# Patient Record
Sex: MALE
[De-identification: names, ages, dates, MRNs, and addresses within clinical notes are randomized per-mention and may not be internally consistent; named-entity substitution may affect disease eponyms.]

---

## 2023-01-06 ENCOUNTER — NON-APPOINTMENT (OUTPATIENT)
Age: 18
End: 2023-01-06

## 2023-01-06 ENCOUNTER — APPOINTMENT (OUTPATIENT)
Dept: ORTHOPEDIC SURGERY | Facility: CLINIC | Age: 18
End: 2023-01-06
Payer: COMMERCIAL

## 2023-01-06 VITALS — HEIGHT: 68 IN | WEIGHT: 120 LBS | BODY MASS INDEX: 18.19 KG/M2

## 2023-01-06 PROBLEM — Z00.00 ENCOUNTER FOR PREVENTIVE HEALTH EXAMINATION: Status: ACTIVE | Noted: 2023-01-06

## 2023-01-06 PROCEDURE — 99203 OFFICE O/P NEW LOW 30 MIN: CPT

## 2023-01-06 PROCEDURE — 73630 X-RAY EXAM OF FOOT: CPT | Mod: 50

## 2023-01-06 PROCEDURE — 73610 X-RAY EXAM OF ANKLE: CPT | Mod: 50

## 2023-01-06 NOTE — IMAGING
[de-identified] :   Physical exam of the right foot/ankle:   Mild swelling over the lateral aspect of the right ankle.  No ecchymosis, or erythema appreciated. Skin is intact. Patient able to plantarflex, dorsiflex, invert, and eldon the ankle with no limitations. mild tenderness over the ATFL.  no tenderness over the PTFL, Deltoid, or CFL. No tenderness over the medial and lateral malleoli. No tenderness over the Achilles tendon or calcaneus. no tenderness over the metatarsals.  No tenderness over Lisfranc.   Ankle mortis within normal limits. DP pulses palpable.

## 2023-01-06 NOTE — DATA REVIEWED
[FreeTextEntry1] :   Bilateral X-rays of the right foot and ankle taken here in the office today:   No acute fractures, subluxation, or dislocation.  No acute osseous abnormalities.  Closed growth plates.

## 2023-01-06 NOTE — DISCUSSION/SUMMARY
[de-identified] :  treatment plan for right ankle sprain:\par \par   I recommended anti-inflammatory medication.  Patient prefers to continue taking Advil /ibuprofen over-the-counter as needed for pain. benefits discussed.\par \par  I recommended patient continue using Cam walker boot for another 2-3 weeks.  It is to wear the boot at all times except when showering and sleeping.  Encouraged gentle range of motion.  encouraged rest, ice, and elevation.  Patient is able to soak right ankle/ foot in a bucket of  Warm water and Epsom salt.\par \par  note provided the patient will be cleared to work beginning 01/20/2023. All questions and concerns addressed patient's satisfaction.  Patient and mother expressed full understanding of treatment plan.\par \par   Patient will follow up in 3-4 weeks for further evaluation and treatment.

## 2023-01-06 NOTE — HISTORY OF PRESENT ILLNESS
[de-identified] :  patient is a 17-year-old male here accompanied by his mother for evaluation of right ankle pain.  Patient reports on 12/23/2022 [place] was [place] a cruise ship when he missed a step and inverted his right ankle.  Patient reports immediate swelling and pain following the injury.  X-rays were taken on the cruise ship.  Patient was told there is no fracture of the ankle and was placed in a Cam walker boot.  Patient has been using the boot consistently since the time of injury.  Patient reports his swelling and pain of the right ankle has improved but mildly persisted.  Patient is able to bear weight however experiences pain when doing so outside of the boot.

## 2023-01-09 ENCOUNTER — APPOINTMENT (OUTPATIENT)
Dept: ORTHOPEDIC SURGERY | Facility: CLINIC | Age: 18
End: 2023-01-09

## 2023-02-03 ENCOUNTER — APPOINTMENT (OUTPATIENT)
Dept: ORTHOPEDIC SURGERY | Facility: CLINIC | Age: 18
End: 2023-02-03
Payer: COMMERCIAL

## 2023-02-03 VITALS — HEIGHT: 68 IN | BODY MASS INDEX: 18.19 KG/M2 | WEIGHT: 120 LBS

## 2023-02-03 DIAGNOSIS — S93.401A SPRAIN OF UNSPECIFIED LIGAMENT OF RIGHT ANKLE, INITIAL ENCOUNTER: ICD-10-CM

## 2023-02-03 PROCEDURE — 99213 OFFICE O/P EST LOW 20 MIN: CPT

## 2023-02-03 NOTE — IMAGING
[de-identified] : On examination of the right ankle has mild swelling over the lateral malleolus.  No erythema, no ecchymosis.  He is nontender over the medial or lateral malleolus.  He is tender over the ATFL and CFL.  He is nontender over the deltoid ligament.  He is nontender over the talar dome.  He is nontender over the Achilles or the calcaneus, negative Miller's test, no calf tenderness.  He has no tenderness palpation of the foot.  He is able to dorsiflex and plantar flex, sensation intact throughout, 2+ DP and PT pulses.\par \par

## 2023-02-03 NOTE — HISTORY OF PRESENT ILLNESS
[de-identified] : 17-year-old male is here today for follow-up of his right ankle sprain.  The injury happened 6 weeks ago.  He states he is feeling better but is still having some pain and swelling in the ankle.  He is no longer wearing the boot.  He states he stopped wearing it about a week ago.  Denies any new injury or trauma.  He denies any numbness tingling or any calf pain.

## 2023-02-03 NOTE — DISCUSSION/SUMMARY
[de-identified] : At this time I gave him a script to start doing some physical therapy.  He can wear an ankle support brace.  Warm compresses and anti-inflammatories as needed for pain.  I will see him back in 4-6 weeks for repeat evaluation if he continues to have pain. Patient will call me if any other problems or concerns.  Patient verbalized understanding and agreed with the plan, all questions were answered in the office today.\par

## 2023-03-10 ENCOUNTER — APPOINTMENT (OUTPATIENT)
Dept: ORTHOPEDIC SURGERY | Facility: CLINIC | Age: 18
End: 2023-03-10